# Patient Record
Sex: MALE | Race: BLACK OR AFRICAN AMERICAN | Employment: PART TIME | URBAN - METROPOLITAN AREA
[De-identification: names, ages, dates, MRNs, and addresses within clinical notes are randomized per-mention and may not be internally consistent; named-entity substitution may affect disease eponyms.]

---

## 2019-08-26 ENCOUNTER — OFFICE VISIT (OUTPATIENT)
Dept: INTERNAL MEDICINE CLINIC | Age: 24
End: 2019-08-26

## 2019-08-26 VITALS
WEIGHT: 175 LBS | DIASTOLIC BLOOD PRESSURE: 70 MMHG | RESPIRATION RATE: 14 BRPM | SYSTOLIC BLOOD PRESSURE: 118 MMHG | BODY MASS INDEX: 23.7 KG/M2 | TEMPERATURE: 99 F | HEIGHT: 72 IN | HEART RATE: 84 BPM | OXYGEN SATURATION: 100 %

## 2019-08-26 DIAGNOSIS — L91.0 KELOID SCAR: ICD-10-CM

## 2019-08-26 DIAGNOSIS — F51.04 CHRONIC INSOMNIA: Primary | ICD-10-CM

## 2019-08-26 RX ORDER — TRAZODONE HYDROCHLORIDE 50 MG/1
50-100 TABLET ORAL
Qty: 45 TAB | Refills: 0 | Status: SHIPPED | OUTPATIENT
Start: 2019-08-26

## 2019-08-26 NOTE — PROGRESS NOTES
HISTORY OF PRESENT ILLNESS  Donna Bell is a 27 y.o. male. HPI  Mr. Ni Mays presents as a new patient to establish care. 1) He c/o chronic insomnia. He has been in a vicious sleep cycle since college and is unsure how to correct it. - Bedtime: 6 am  - Wake time: 10-11 am.   - Nappin-2 pm to 5-6 pm.     He is not currently working but is looking. When he starts work, it will be a daytime position. 2) He c/o symptoms of depression and anxiety but is wondering how much is related to his poor sleeping. 3) Also c/o keloids and interested in options to minimize. Review of Systems   Psychiatric/Behavioral: Positive for depression. The patient is nervous/anxious and has insomnia. Visit Vitals  /70 (BP 1 Location: Left arm, BP Patient Position: Sitting)   Pulse 84   Temp 99 °F (37.2 °C) (Oral)   Resp 14   Ht 6' (1.829 m)   Wt 175 lb (79.4 kg)   SpO2 100%   BMI 23.73 kg/m²       Physical Exam   Constitutional: He is oriented to person, place, and time. He appears well-developed and well-nourished. No distress. HENT:   Head: Normocephalic and atraumatic. Right Ear: Tympanic membrane, external ear and ear canal normal.   Left Ear: Tympanic membrane, external ear and ear canal normal.   Nose: Nose normal.   Mouth/Throat: Uvula is midline, oropharynx is clear and moist and mucous membranes are normal. No oropharyngeal exudate, posterior oropharyngeal edema, posterior oropharyngeal erythema or tonsillar abscesses. Eyes: Pupils are equal, round, and reactive to light. Conjunctivae are normal. No scleral icterus. Neck: Neck supple. Cardiovascular: Normal rate, regular rhythm and normal heart sounds. Exam reveals no gallop. No murmur heard. Pulses:       Dorsalis pedis pulses are 2+ on the right side, and 2+ on the left side. Posterior tibial pulses are 2+ on the right side, and 2+ on the left side. No pedal edema.    Pulmonary/Chest: Effort normal and breath sounds normal. No respiratory distress. He has no decreased breath sounds. He has no wheezes. He has no rhonchi. He has no rales. Lymphadenopathy:        Head (right side): No submandibular and no tonsillar adenopathy present. Head (left side): No submandibular and no tonsillar adenopathy present. He has no cervical adenopathy. Right: No supraclavicular adenopathy present. Left: No supraclavicular adenopathy present. Neurological: He is alert and oriented to person, place, and time. Skin: Skin is warm and dry. Keloid left forearm. Several other prominent scars noted - left shoulder, chest, arms. Psychiatric: He has a normal mood and affect. His speech is normal.       ASSESSMENT and PLAN  Diagnoses and all orders for this visit:    1. Chronic insomnia  -     traZODone (DESYREL) 50 mg tablet; Take 1-2 Tabs by mouth nightly as needed for Sleep. #45/0 refills. - Short-term use for assisting with reset of sleep cycle. Advised patient not for prolonged use. - Detailed conversation regarding sleep hygiene and plan. Patient to work to gradually step back bedtime to 10-11 pm (30 min to 1 hour at a time). Work to minimize daytime naps during this process - absolutely limit to 1 hour in duration.   - Avoid all electronics in the hour prior to bed. - Printed material given regarding sleep hygiene.       2. Keloid scar  -     REFERRAL TO DERMATOLOGY

## 2019-08-26 NOTE — PATIENT INSTRUCTIONS

## 2019-08-26 NOTE — PROGRESS NOTES
Chitra Curtis is a 27 y.o.  male presents today for office visit for establish care. Pt would also like to discuss physical. Pt is not fasting. Pt is in Room # 12      1. Have you been to the ER, urgent care clinic since your last visit? Hospitalized since your last visit? No    2. Have you seen or consulted any other health care providers outside of the [address] [place] [address] since your last visit? Include any pap smears or colon screening. No    Upcoming Appts  none    Health Maintenance reviewed     VORB: No orders of the defined types were placed in this encounter.   Tk Doyle LPN